# Patient Record
(demographics unavailable — no encounter records)

---

## 2024-11-22 NOTE — HEALTH RISK ASSESSMENT
[Good] : ~his/her~ current health as good [Fair] :  ~his/her~ mood as fair [No] : In the past 12 months have you used drugs other than those required for medical reasons? No [No falls in past year] : Patient reported no falls in the past year [0] : 2) Feeling down, depressed, or hopeless: Not at all (0) [Fully functional (bathing, dressing, toileting, transferring, walking, feeding)] : Fully functional (bathing, dressing, toileting, transferring, walking, feeding) [Fully functional (using the telephone, shopping, preparing meals, housekeeping, doing laundry, using] : Fully functional and needs no help or supervision to perform IADLs (using the telephone, shopping, preparing meals, housekeeping, doing laundry, using transportation, managing medications and managing finances) [de-identified] : walking [de-identified] : regular  [de-identified] : no [Reports changes in hearing] : Reports no changes in hearing [Reports changes in vision] : Reports no changes in vision

## 2024-11-22 NOTE — ASSESSMENT
[FreeTextEntry1] : annual exam  Saw rheumatologist for psoriatic arthritis and generally has not been feeling well  will follow up they did lab work there   Blood work drawn in office today  EKG- normal  health maintenance- is going for mammo next week declines colon cancer screening

## 2024-11-22 NOTE — HISTORY OF PRESENT ILLNESS
Detail Level: Simple [FreeTextEntry1] : annual exam Additional Notes: Patient will bring medication doses to next visit [de-identified] : annual exam  Saw rheumatologist for psoriatic arthritis and generally has not been feeling well  will follow up they did lab work there   Quality 111:Pneumonia Vaccination Status For Older Adults: Pneumococcal Vaccination not Administered or Previously Received, Reason not Otherwise Specified Quality 130: Documentation Of Current Medications In The Medical Record: Eligible clinician attests to documenting in the medical record the patient is not eligible for a current list of medications being obtained, updated, or reviewed by the eligible clinician